# Patient Record
Sex: MALE | Race: WHITE
[De-identification: names, ages, dates, MRNs, and addresses within clinical notes are randomized per-mention and may not be internally consistent; named-entity substitution may affect disease eponyms.]

---

## 2018-05-19 ENCOUNTER — HOSPITAL ENCOUNTER (EMERGENCY)
Dept: HOSPITAL 56 - MW.ED | Age: 43
Discharge: HOME | End: 2018-05-19
Payer: COMMERCIAL

## 2018-05-19 DIAGNOSIS — Z79.899: ICD-10-CM

## 2018-05-19 DIAGNOSIS — J06.9: Primary | ICD-10-CM

## 2018-05-19 DIAGNOSIS — F17.210: ICD-10-CM

## 2018-05-19 LAB
CHLORIDE SERPL-SCNC: 106 MMOL/L (ref 98–107)
SODIUM SERPL-SCNC: 141 MMOL/L (ref 136–148)
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
## 2018-05-21 NOTE — CR
EXAM DATE: 18



PATIENT'S AGE: 43





Patient: DUKE MCKEON



Facility: [place], [place]

Patient ID: [id number]

Site Patient ID: [id number].

Site Accession #: [id number]

: 1975

Study: XRay Chest [id number]/19/2018 3:18:30 PM

Ordering Physician: Doctor Temp



Final Report: 

INDICATION:

Pain. Shortness of breath.



TECHNIQUE:

PA and lateral chest.



FINDINGS:

Clear lungs. Normal heart size and pulmonary vascularity. Normal included 
skeletal thorax.



IMPRESSION:

Normal two view chest.





Dictated by Jose Luis Shipman MD @ May 19 2018 3:30PM

(Electronic Signature)



Report Signed by Proxy.
LATASHA